# Patient Record
Sex: FEMALE | Race: WHITE | NOT HISPANIC OR LATINO | URBAN - METROPOLITAN AREA
[De-identification: names, ages, dates, MRNs, and addresses within clinical notes are randomized per-mention and may not be internally consistent; named-entity substitution may affect disease eponyms.]

---

## 2020-08-19 ENCOUNTER — OFFICE VISIT (OUTPATIENT)
Dept: URBAN - METROPOLITAN AREA CLINIC 98 | Facility: CLINIC | Age: 55
End: 2020-08-19
Payer: COMMERCIAL

## 2020-08-19 DIAGNOSIS — R16.2 HEPATOSPLENOMEGALY: ICD-10-CM

## 2020-08-19 DIAGNOSIS — R14.0 ABDOMINAL DISTENSION: ICD-10-CM

## 2020-08-19 DIAGNOSIS — K50.00 CROHN'S DISEASE OF SMALL INTESTINE WITHOUT COMPLICATION: ICD-10-CM

## 2020-08-19 DIAGNOSIS — R19.00 ABDOMINAL SWELLING: ICD-10-CM

## 2020-08-19 PROBLEM — 36760000: Status: ACTIVE | Noted: 2020-08-19

## 2020-08-19 PROBLEM — 60728008: Status: ACTIVE | Noted: 2020-08-19

## 2020-08-19 PROCEDURE — 99214 OFFICE O/P EST MOD 30 MIN: CPT | Performed by: INTERNAL MEDICINE

## 2020-08-19 PROCEDURE — 3017F COLORECTAL CA SCREEN DOC REV: CPT | Performed by: INTERNAL MEDICINE

## 2020-08-19 PROCEDURE — G8427 DOCREV CUR MEDS BY ELIG CLIN: HCPCS | Performed by: INTERNAL MEDICINE

## 2020-08-19 RX ORDER — VENLAFAXINE HYDROCHLORIDE 75 MG/1
CAPSULE, EXTENDED RELEASE ORAL
Qty: 0 | Refills: 0 | Status: ACTIVE | COMMUNITY
Start: 1900-01-01 | End: 1900-01-01

## 2020-08-19 RX ORDER — ZOLPIDEM TARTRATE 10 MG/1
TABLET, FILM COATED ORAL
Qty: 0 | Refills: 0 | Status: ACTIVE | COMMUNITY
Start: 1900-01-01 | End: 1900-01-01

## 2020-08-19 RX ORDER — INFLIXIMAB 100 MG/10ML
INJECTION, POWDER, LYOPHILIZED, FOR SOLUTION INTRAVENOUS
Qty: 0 | Refills: 0 | Status: ACTIVE | COMMUNITY
Start: 1900-01-01 | End: 1900-01-01

## 2020-08-19 NOTE — HPI-TODAY'S VISIT:
History of Crohn's disease.  Status post ileocolonic resection past.  Patient was on Remicade therapy until 2016.  Colonoscopy November 2019 showed no active disease in the colon or ileum with normal anastomosis. No Crohn's therapy past 4 years. c/o swollen abdomen. massive swelling of the abdomen first noticed 10 days ago pain and tenderness on right side

## 2020-08-21 LAB
A/G RATIO: 1.4
ALBUMIN: 4.1
ALKALINE PHOSPHATASE: 66
ALT (SGPT): 22
AMYLASE: 30
AST (SGOT): 32
BILIRUBIN, TOTAL: 0.3
BUN/CREATININE RATIO: 10
BUN: 9
CA 19-9: 5
CALCIUM: 9.4
CANCER ANTIGEN (CA) 125: 1005
CARBON DIOXIDE, TOTAL: 23
CHLORIDE: 97
CREATININE: 0.88
EGFR IF AFRICN AM: 86
EGFR IF NONAFRICN AM: 74
GLOBULIN, TOTAL: 3
GLUCOSE: 72
HEMATOCRIT: 40.5
HEMOGLOBIN: 13.5
LIPASE: 27
MCH: 28
MCHC: 33.3
MCV: 84
NRBC: (no result)
PLATELETS: 410
POTASSIUM: 4.6
PROTEIN, TOTAL: 7.1
RBC: 4.82
RDW: 12.5
SODIUM: 136
WBC: 11.2

## 2022-12-26 ENCOUNTER — LAB OUTSIDE AN ENCOUNTER (OUTPATIENT)
Age: 57
End: 2022-12-26

## 2022-12-26 LAB
ABSOLUTE BASOPHIL COUNT: 0.05
ABSOLUTE EOSINOPHIL COUNT: 0.15
ABSOLUTE IMMATURE GRANULOCYTE  COUNT: 0.07
ABSOLUTE LYMPHOCYTE COUNT: 2.71
ABSOLUTE MONOCYTE COUNT: 1.08
ABSOLUTE NEUTROPHIL COUNT (ANC): 3.51
ABSOLUTE NRBC  COUNT: 0
AG RATIO: 1
ALBUMIN LEVEL: 4.4
ALK PHOS: 68
ALT: 23
ANION GAP: 10
AST: 26
BASOPHIL AUTO: 1
BILIRUBIN TOTAL: 0.3
BUN/CREAT RATIO: 20
BUN: 21
CALCIUM LEVEL: 10.1
CHLORIDE LEVEL: 102
CO2 LEVEL: 28
CREATININE LEVEL: 1
EOS AUTO: 2
GFR 2021: >60
GLUCOSE LEVEL: 111
HCT: 38.9
HGB: 12.4
IMMATURE GRANULOCYTES AUTO: 0.9
LYMPH AUTO: 36
MAGNESIUM LEVEL: 1.8
MCH: 31.4
MCHC: 31.9
MCV: 98.5
MONO AUTO: 14
MPV: 9.5
NEUTRO AUTO: 46
NRBC AUTO: 0
OSMO (CALC): 283
PERFORMING LAB: (no result)
PLATELETS: 218
POTASSIUM LEVEL: 4.2
PROTEIN TOTAL: 7.8
RBC: 3.95
RDW: 15.5
SODIUM LEVEL: 140
WBC: 7.6

## 2023-07-31 ENCOUNTER — WEB ENCOUNTER (OUTPATIENT)
Dept: URBAN - METROPOLITAN AREA CLINIC 96 | Facility: CLINIC | Age: 58
End: 2023-07-31

## 2023-07-31 ENCOUNTER — OFFICE VISIT (OUTPATIENT)
Dept: URBAN - METROPOLITAN AREA CLINIC 96 | Facility: CLINIC | Age: 58
End: 2023-07-31
Payer: COMMERCIAL

## 2023-07-31 ENCOUNTER — DASHBOARD ENCOUNTERS (OUTPATIENT)
Age: 58
End: 2023-07-31

## 2023-07-31 VITALS
DIASTOLIC BLOOD PRESSURE: 64 MMHG | BODY MASS INDEX: 23.7 KG/M2 | TEMPERATURE: 97.5 F | WEIGHT: 151 LBS | HEIGHT: 67 IN | SYSTOLIC BLOOD PRESSURE: 97 MMHG | HEART RATE: 98 BPM

## 2023-07-31 DIAGNOSIS — K50.818 CROHN'S DISEASE OF BOTH SMALL AND LARGE INTESTINE WITH OTHER COMPLICATION: ICD-10-CM

## 2023-07-31 DIAGNOSIS — C56.3 MALIGNANT NEOPLASM OF BOTH OVARIES: ICD-10-CM

## 2023-07-31 DIAGNOSIS — R19.7 DIARRHEA, UNSPECIFIED TYPE: ICD-10-CM

## 2023-07-31 DIAGNOSIS — Z90.49 HISTORY OF BOWEL RESECTION: ICD-10-CM

## 2023-07-31 PROBLEM — 71833008: Status: ACTIVE | Noted: 2023-07-31

## 2023-07-31 PROBLEM — 442461003: Status: ACTIVE | Noted: 2023-07-31

## 2023-07-31 PROCEDURE — 99204 OFFICE O/P NEW MOD 45 MIN: CPT | Performed by: INTERNAL MEDICINE

## 2023-07-31 PROCEDURE — 99244 OFF/OP CNSLTJ NEW/EST MOD 40: CPT | Performed by: INTERNAL MEDICINE

## 2023-07-31 RX ORDER — CHOLESTYRAMINE 4 G/9G
1 SCOOP POWDER, FOR SUSPENSION ORAL
Qty: 60 | Refills: 3 | OUTPATIENT
Start: 2023-07-31

## 2023-07-31 NOTE — HPI-TODAY'S VISIT:
This patient was referred by Dr. Ramila Rouse for an evaluation of bowels.  A copy of this will be sent to the referring provider.  57 y.o. female Seen previously by Dr. Patel and Kristy Getting chemotherapy - ovarian cancer Severe 2.5 weeks after Elahere Only reason it stopped was from an older powder she had at home Did steroids - no help Last infusion of Elahere was 3+ weeks ago; was due 4 days ago (Last Thursday) Currently, bowels are fine Only did 2 scoops of cholestyramine - 1 scoop for 2 days - hadn't taken in yrs

## 2024-08-19 ENCOUNTER — TELEPHONE ENCOUNTER (OUTPATIENT)
Dept: URBAN - METROPOLITAN AREA CLINIC 96 | Facility: CLINIC | Age: 59
End: 2024-08-19

## 2024-08-19 ENCOUNTER — LAB OUTSIDE AN ENCOUNTER (OUTPATIENT)
Dept: URBAN - METROPOLITAN AREA CLINIC 96 | Facility: CLINIC | Age: 59
End: 2024-08-19

## 2024-08-19 ENCOUNTER — OFFICE VISIT (OUTPATIENT)
Dept: URBAN - METROPOLITAN AREA CLINIC 96 | Facility: CLINIC | Age: 59
End: 2024-08-19
Payer: COMMERCIAL

## 2024-08-19 ENCOUNTER — OFFICE VISIT (OUTPATIENT)
Dept: URBAN - METROPOLITAN AREA CLINIC 96 | Facility: CLINIC | Age: 59
End: 2024-08-19

## 2024-08-19 VITALS
TEMPERATURE: 97.5 F | WEIGHT: 145 LBS | HEART RATE: 90 BPM | HEIGHT: 67 IN | SYSTOLIC BLOOD PRESSURE: 112 MMHG | BODY MASS INDEX: 22.76 KG/M2 | DIASTOLIC BLOOD PRESSURE: 67 MMHG

## 2024-08-19 DIAGNOSIS — C56.9 MALIGNANT NEOPLASM OF OVARY, UNSPECIFIED LATERALITY: ICD-10-CM

## 2024-08-19 DIAGNOSIS — K52.89 OTHER AND UNSPECIFIED NONINFECTIOUS GASTROENTERITIS AND COLITIS: ICD-10-CM

## 2024-08-19 DIAGNOSIS — Z87.19 HISTORY OF CROHN'S DISEASE: ICD-10-CM

## 2024-08-19 PROBLEM — 308273005: Status: ACTIVE | Noted: 2024-08-19

## 2024-08-19 PROBLEM — 363443007: Status: ACTIVE | Noted: 2024-08-19

## 2024-08-19 PROCEDURE — 99214 OFFICE O/P EST MOD 30 MIN: CPT | Performed by: INTERNAL MEDICINE

## 2024-08-19 RX ORDER — CHOLESTYRAMINE 4 G/9G
1 SCOOP POWDER, FOR SUSPENSION ORAL ONCE A DAY
Qty: 90 | Refills: 3 | OUTPATIENT
Start: 2024-08-19

## 2024-08-19 NOTE — HPI-TODAY'S VISIT:
57 yo WF Presents w/  Seen 1 year ago Still getting diarrhea from chemo Elahere commonly causes it - treatments every 3 weeks Using Lomotil - takes from Day 3 til done with it - about 1 week - uses about 40 pills - 2 pills every 6 hrs If taking Lomotil, typically ok  Around day 10, slows down and only lite diarrhea - stops Lomotil around that time Outside of chemo, no issues  Stopped chemo around January When not on chemo, doing fine The Elahere is really the issue

## 2024-08-22 LAB
ADENOVIRUS F 40/41: NOT DETECTED
CALPROTECTIN, STOOL - QDX: (no result)
CAMPYLOBACTER: NOT DETECTED
CLOSTRIDIUM DIFFICILE: NOT DETECTED
ENTAMOEBA HISTOLYTICA: NOT DETECTED
ENTEROAGGREGATIVE E.COLI: NOT DETECTED
ENTEROTOXIGENIC E.COLI: NOT DETECTED
ESCHERICHIA COLI O157: NOT DETECTED
FECAL FAT, QUALITATIVE: (no result)
GIARDIA LAMBLIA: NOT DETECTED
NOROVIRUS GI/GII: NOT DETECTED
PANCREATICELASTASE ELISA, STOOL: (no result)
ROTAVIRUS A: NOT DETECTED
SALMONELLA SPP.: NOT DETECTED
SHIGA-LIKE TOXIN PRODUCING E.COLI: NOT DETECTED
SHIGELLA SPP. / ENTEROINVASIVE E.COLI: NOT DETECTED
VIBRIO PARAHAEMOLYTICUS: NOT DETECTED
VIBRIO SPP.: NOT DETECTED
YERSINIA ENTEROCOLITICA: NOT DETECTED

## 2024-08-23 ENCOUNTER — WEB ENCOUNTER (OUTPATIENT)
Dept: URBAN - METROPOLITAN AREA CLINIC 96 | Facility: CLINIC | Age: 59
End: 2024-08-23